# Patient Record
Sex: FEMALE | Race: WHITE | Employment: UNEMPLOYED
[De-identification: names, ages, dates, MRNs, and addresses within clinical notes are randomized per-mention and may not be internally consistent; named-entity substitution may affect disease eponyms.]

---
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
## 2023-03-13 ENCOUNTER — TELEPHONE (OUTPATIENT)
Dept: OPHTHALMOLOGY | Facility: CLINIC | Age: 23
End: 2023-03-13
Payer: MEDICAID

## 2023-03-13 NOTE — TELEPHONE ENCOUNTER
----- Message from Leisa Spann sent at 3/13/2023  4:08 PM CDT -----  Contact: Self  JONATAN pt.   ----- Message -----  From: Cindy Barrios  Sent: 3/13/2023   3:32 PM CDT  To: [facility] Clinical Support, #    Type:  Sooner Appointment Request    Caller is requesting a sooner appointment.  Caller declined first available appointment listed below.  Caller will not accept being placed on the waitlist and is requesting a message be sent to doctor.    Name of Caller:  Patient  When is the first available appointment?  N/A  Symptoms:  Pt needs eye exam & glasses  Best Call Back Number:  [phone]  Additional Information:  Please call pt back to advise. Thank You

## 2023-03-14 ENCOUNTER — TELEPHONE (OUTPATIENT)
Dept: OPHTHALMOLOGY | Facility: CLINIC | Age: 23
End: 2023-03-14
Payer: MEDICAID

## 2023-03-14 NOTE — TELEPHONE ENCOUNTER
----- Message from Leisa Solo sent at 3/14/2023  9:32 AM CDT -----  Medicaid pt. Needing an appt.     ----- Message -----  From: Ismael Segovia  Sent: 3/13/2023   4:51 PM CDT  To: [facility] Clinical Support        Name of Who is Calling:PT          What is the request in detail:PT is returning a call placed by the office to her, she believes the call is in reference to an appointment. Please be Advised!          Can the clinic reply by [facility]:no          What Number to Call Back if not in [phone]

## 2025-05-05 ENCOUNTER — OFFICE VISIT (OUTPATIENT)
Dept: URGENT CARE | Facility: CLINIC | Age: 25
End: 2025-05-05
Payer: OTHER MISCELLANEOUS

## 2025-05-05 VITALS
RESPIRATION RATE: 18 BRPM | WEIGHT: 293 LBS | SYSTOLIC BLOOD PRESSURE: 148 MMHG | OXYGEN SATURATION: 99 % | TEMPERATURE: 98 F | HEART RATE: 90 BPM | BODY MASS INDEX: 51.91 KG/M2 | HEIGHT: 63 IN | DIASTOLIC BLOOD PRESSURE: 104 MMHG

## 2025-05-05 DIAGNOSIS — S09.93XA FACIAL INJURY, INITIAL ENCOUNTER: Primary | ICD-10-CM

## 2025-05-05 DIAGNOSIS — S02.2XXA CLOSED FRACTURE OF NASAL BONE, INITIAL ENCOUNTER: ICD-10-CM

## 2025-05-05 DIAGNOSIS — S00.33XA CONTUSION OF NOSE, INITIAL ENCOUNTER: ICD-10-CM

## 2025-05-05 RX ORDER — DEXTROAMPHETAMINE SACCHARATE, AMPHETAMINE ASPARTATE, DEXTROAMPHETAMINE SULFATE AND AMPHETAMINE SULFATE 2.5; 2.5; 2.5; 2.5 MG/1; MG/1; MG/1; MG/1
1 TABLET ORAL
COMMUNITY
Start: 2025-02-09

## 2025-05-05 RX ORDER — MELOXICAM 7.5 MG/1
7.5 TABLET ORAL DAILY
Qty: 30 TABLET | Refills: 1 | Status: SHIPPED | OUTPATIENT
Start: 2025-05-05

## 2025-05-05 RX ORDER — ARIPIPRAZOLE 10 MG/1
10 TABLET ORAL
COMMUNITY
Start: 2025-03-24

## 2025-05-05 RX ORDER — TOPIRAMATE 25 MG/1
25 TABLET ORAL 2 TIMES DAILY
COMMUNITY

## 2025-05-05 RX ORDER — TOPIRAMATE 50 MG/1
50 TABLET, FILM COATED ORAL 2 TIMES DAILY
COMMUNITY

## 2025-05-05 RX ORDER — BUSPIRONE HYDROCHLORIDE 15 MG/1
15 TABLET ORAL NIGHTLY
COMMUNITY
Start: 2025-03-24

## 2025-05-05 RX ORDER — DEXTROAMPHETAMINE SULFATE, DEXTROAMPHETAMINE SACCHARATE, AMPHETAMINE SULFATE AND AMPHETAMINE ASPARTATE 7.5; 7.5; 7.5; 7.5 MG/1; MG/1; MG/1; MG/1
CAPSULE, EXTENDED RELEASE ORAL
COMMUNITY
Start: 2025-02-09

## 2025-05-05 RX ORDER — PRAZOSIN HYDROCHLORIDE 2 MG/1
2 CAPSULE ORAL NIGHTLY
COMMUNITY
Start: 2025-03-24

## 2025-05-05 RX ORDER — ONDANSETRON 4 MG/1
4 TABLET, ORALLY DISINTEGRATING ORAL EVERY 8 HOURS PRN
Qty: 15 TABLET | Refills: 0 | Status: SHIPPED | OUTPATIENT
Start: 2025-05-05

## 2025-05-05 NOTE — LETTER
[facility] [place] [address], [address]  [facility] [address]  Phone: [phone]  Fax: [phone]  [facility]: [phone]    Pt Name: Diane Campa  Injury Date: 05/05/2025   Employee ID: [id number] Date of First Treatment: 05/05/2025   Company: [employer]      Appointment Time: 07:45 AM Arrived: 800   Provider: Mario Garcia MD Time Out:845     Office Treatment:   1. Facial injury, initial encounter    2. Contusion of nose, initial encounter      Medications Ordered This Encounter   Medications    meloxicam (MOBIC) 7.5 MG tablet    ondansetron (ZOFRAN-ODT) 4 MG TbDL      Patient Instructions: Attention not to aggravate affected area, Daily home exercises/warm soaks, Apply ice 24-48 hours then apply heat/warm soaks    Restrictions: Home today, Regular Duty     Return Appointment: 5/7 at 10am     If Rx a medication that can be sedating, DO NOT TAKE DURING YOUR WORK DAY.    Some OTC measures to help in recovery(if no allergies to, renal issues or pregnant):  Tylenol 325mg 3x per day  Ibuprofen 400mg 3x per day OR Aleve regular strength one tablet 2x per day  Take Pepcid 10mg BID  If applicable or discussed: Magnesium OTC daily; Topical Voltaren Gel; Lidocaine patches, neoprene OTC compression sleeve, shoe inserts  Massage area if possible  Resting of the injured area  Ice for ankle, wrist or elbow injury  Elevation of the injured area if applicable  Heating pad for muscle injury  Stretching/ROM exercises as described in clinic.   ** BE ADVISED: You should be in regular contact with your W/C  to know the status of your claim and /or (if any)pending referrals**

## 2025-05-05 NOTE — PROGRESS NOTES
"Subjective:      Patient ID: Diane Campa is a 24 y.o. female.    Vitals:  height is 5' 3" (1.6 m) and weight is 155.6 kg (343 lb) (abnormal). Her oral temperature is 98 °F (36.7 °C). Her blood pressure is 148/104 (abnormal) and her pulse is 90. Her respiration is 18 and oxygen saturation is 99%.     Chief Complaint: Facial Injury    24/F, fell at work today . (5/5/25)   She fell on her face, injuring her nose. She is not taking anything OTC for her 7/10 pain. She is also complaining for nausea and dizziness.      Facial Injury   The incident occurred 1 to 3 hours ago. The injury mechanism was a fall. There was no loss of consciousness. The volume of blood lost was moderate. The quality of the pain is described as pulsating and aching. The pain is at a severity of 7/10. The pain is moderate. The pain has been constant since the injury. She has tried nothing for the symptoms.       HENT:  Positive for facial trauma.       Objective:     Physical Exam  Nose bleeding- hemostasis achieved in clinic  Swelling of bridge of nose.   Headache  Assessment:     1. Facial injury, initial encounter    2. Contusion of nose, initial encounter    3. Closed fracture of nasal bone, initial encounter        Plan:       Facial injury, initial encounter  -     XR NASAL BONES; Future; Expected date: 05/05/2025    Contusion of nose, initial encounter  -     XR NASAL BONES; Future; Expected date: 05/05/2025    Closed fracture of nasal bone, initial encounter    Other orders  -     ondansetron (ZOFRAN-ODT) 4 MG TbDL; Take 1 tablet (4 mg total) by mouth every 8 (eight) hours as needed.  Dispense: 15 tablet; Refill: 0  -     meloxicam (MOBIC) 7.5 MG tablet; Take 1 tablet (7.5 mg total) by mouth once daily.  Dispense: 30 tablet; Refill: 1      XR NASAL BONES  Result Date: 5/5/2025  EXAMINATION: XR NASAL BONES CLINICAL HISTORY: Unspecified injury of face, initial encounter TECHNIQUE: Three views nasal bones. COMPARISON: None FINDINGS: " There is slight cortical offset on the right lateral view with mild overlying soft tissue swelling, raising suspicion for nasal bone fracture.     As above Electronically signed by: Nhan Garrett MD Date:    05/05/2025 Time:    08:48

## 2025-05-07 ENCOUNTER — OFFICE VISIT (OUTPATIENT)
Dept: URGENT CARE | Facility: CLINIC | Age: 25
End: 2025-05-07
Payer: OTHER MISCELLANEOUS

## 2025-05-07 VITALS
OXYGEN SATURATION: 99 % | BODY MASS INDEX: 51.91 KG/M2 | WEIGHT: 293 LBS | RESPIRATION RATE: 16 BRPM | HEIGHT: 63 IN | TEMPERATURE: 98 F

## 2025-05-07 DIAGNOSIS — S02.2XXA CLOSED FRACTURE OF NASAL BONE, INITIAL ENCOUNTER: Primary | ICD-10-CM

## 2025-05-07 DIAGNOSIS — S00.33XA CONTUSION OF NOSE, INITIAL ENCOUNTER: ICD-10-CM

## 2025-05-07 DIAGNOSIS — Z02.6 ENCOUNTER RELATED TO WORKER'S COMPENSATION CLAIM: ICD-10-CM

## 2025-05-07 DIAGNOSIS — S09.93XA FACIAL INJURY, INITIAL ENCOUNTER: ICD-10-CM

## 2025-05-07 RX ORDER — HYDROCODONE BITARTRATE AND ACETAMINOPHEN 5; 325 MG/1; MG/1
1 TABLET ORAL EVERY 12 HOURS PRN
Qty: 10 TABLET | Refills: 0 | Status: SHIPPED | OUTPATIENT
Start: 2025-05-07

## 2025-05-07 NOTE — PROGRESS NOTES
Subjective:      Patient ID: Diane Campa is a 24 y.o. female.    Chief Complaint: Follow-up    Patient works at  [employer] and patient's job is   Date of initial injury: 5/5/2025   Description of injury: pt states that she fell and hit her nose on the floor.   What have you taken OTC for your symptoms: n/a  What is your current pain scale out of 10?  8  Pt states that nothing is helping the pain in her nose.  She cannot even wear her glasses.               5/5/2025-24/F, fell at work today . (5/5/25)   She fell on her face, injuring her nose. She is not taking anything OTC for her 7/10 pain. She is also complaining for nausea and dizziness.        Other  This is a new problem. The current episode started in the past 7 days. The problem occurs constantly. The problem has been unchanged. Nothing aggravates the symptoms. She has tried acetaminophen and NSAIDs for the symptoms. The treatment provided no relief.     ROS  Objective:     Physical Exam   Infraorbital bruising bilaterally.   TTP over bridge of nose.   Left frontal headache.   Assessment:      1. Closed fracture of nasal bone, initial encounter    2. Encounter related to worker's compensation claim    3. Contusion of nose, initial encounter    4. Facial injury, initial encounter      Plan:   Will keep off work til next week.     Medications Ordered This Encounter   Medications    HYDROcodone-acetaminophen (NORCO) 5-325 mg per tablet     Sig: Take 1 tablet by mouth every 12 (twelve) hours as needed for Pain.     Dispense:  10 tablet     Refill:  0     Quantity prescribed more than 7 day supply? no     I have reviewed the Prescription Drug Monitoring Program (PDMP) database for this patient prior to prescribing the above opioid medication:   Yes     Patient Instructions: Referral to specialist to be scheduled, once authorized, Attention not to aggravate affected area, Apply ice 24-48 hours then apply heat/warm soaks   Restrictions:  Disabled until next office visit  No follow-ups on file.

## 2025-05-07 NOTE — LETTER
[facility] [place] [address], [address]  [facility] [address]  Phone: [phone]  Fax: [phone]  [facility]: [phone]    Pt Name: Diane Campa  Injury Date:  5/5/25   Employee ID: [id number] Date of First Treatment: 05/07/2025   Company: [employer]      Appointment Time: 08:05 AM Arrived: 830   Provider: Mario Garcia MD Time Out:915     Office Treatment:   1. Closed fracture of nasal bone, initial encounter    2. Encounter related to worker's compensation claim    3. Contusion of nose, initial encounter    4. Facial injury, initial encounter      Medications Ordered This Encounter   Medications    HYDROcodone-acetaminophen (NORCO) 5-325 mg per tablet      Patient Instructions: Referral to specialist to be scheduled, once authorized, Attention not to aggravate affected area, Apply ice 24-48 hours then apply heat/warm soaks    Restrictions: Disabled until next office visit     Return Appointment: 5/12 at 8am or sooner if needed     If Rx a medication that can be sedating, DO NOT TAKE DURING YOUR WORK DAY.    Some OTC measures to help in recovery(if no allergies to, renal issues or pregnant):  Tylenol 325mg 3x per day  Ibuprofen 400mg 3x per day OR Aleve regular strength one tablet 2x per day  Take Pepcid 10mg BID  If applicable or discussed: Magnesium OTC daily; Topical Voltaren Gel; Lidocaine patches, neoprene OTC compression sleeve, shoe inserts  Massage area if possible  Resting of the injured area  Ice for ankle, wrist or elbow injury  Elevation of the injured area if applicable  Heating pad for muscle injury  Stretching/ROM exercises as described in clinic.   ** BE ADVISED: You should be in regular contact with your W/C  to know the status of your claim and /or (if any)pending referrals**

## 2025-05-13 ENCOUNTER — TELEPHONE (OUTPATIENT)
Dept: URGENT CARE | Facility: CLINIC | Age: 25
End: 2025-05-13
Payer: MEDICAID

## 2025-05-16 ENCOUNTER — TELEPHONE (OUTPATIENT)
Dept: URGENT CARE | Facility: CLINIC | Age: 25
End: 2025-05-16
Payer: MEDICAID

## 2025-05-16 NOTE — TELEPHONE ENCOUNTER
Called patient and left a voice message and call back number regarding the missed [facility] Appointment.   AFG

## 2025-06-02 ENCOUNTER — TELEPHONE (OUTPATIENT)
Dept: URGENT CARE | Facility: CLINIC | Age: 25
End: 2025-06-02
Payer: MEDICAID

## 2025-06-30 ENCOUNTER — OFFICE VISIT (OUTPATIENT)
Dept: URGENT CARE | Facility: CLINIC | Age: 25
End: 2025-06-30
Payer: MEDICAID

## 2025-06-30 VITALS
OXYGEN SATURATION: 99 % | TEMPERATURE: 98 F | DIASTOLIC BLOOD PRESSURE: 97 MMHG | SYSTOLIC BLOOD PRESSURE: 144 MMHG | HEIGHT: 63 IN | BODY MASS INDEX: 51.91 KG/M2 | RESPIRATION RATE: 18 BRPM | WEIGHT: 293 LBS | HEART RATE: 93 BPM

## 2025-06-30 DIAGNOSIS — H60.502 ACUTE OTITIS EXTERNA OF LEFT EAR, UNSPECIFIED TYPE: Primary | ICD-10-CM

## 2025-06-30 PROCEDURE — 99214 OFFICE O/P EST MOD 30 MIN: CPT | Mod: S$GLB,,, | Performed by: NURSE PRACTITIONER

## 2025-06-30 RX ORDER — OFLOXACIN 3 MG/ML
10 SOLUTION AURICULAR (OTIC) DAILY
Qty: 5 ML | Refills: 0 | Status: SHIPPED | OUTPATIENT
Start: 2025-06-30 | End: 2025-07-07

## 2025-06-30 NOTE — PATIENT INSTRUCTIONS

## 2025-06-30 NOTE — PROGRESS NOTES
"Subjective:      Patient ID: Diane Campa is a 24 y.o. female.    Vitals:  height is 5' 3" (1.6 m) and weight is 155.6 kg (343 lb) (abnormal). Her oral temperature is 98.2 °F (36.8 °C). Her blood pressure is 144/97 (abnormal) and her pulse is 93. Her respiration is 18 and oxygen saturation is 99%.     Chief Complaint: Otalgia    Patient c/o left ear pain onset 3 days ago.  Pain scale 8/10 pain is starting to radiate down left neck, tylenol was taken no relief.     Otalgia   There is pain in the left ear. This is a new problem. The current episode started in the past 7 days. The problem occurs constantly. The problem has been unchanged. There has been no fever. The pain is at a severity of 8/10. The pain is severe. Pertinent negatives include no abdominal pain, coughing, diarrhea, ear discharge, headaches, hearing loss, neck pain, rash, rhinorrhea, sore throat or vomiting. She has tried acetaminophen for the symptoms. The treatment provided no relief.       Constitution: Negative. Negative for chills, sweating and fatigue.   HENT:  Positive for ear pain. Negative for ear discharge, hearing loss, facial swelling, congestion and sore throat.    Neck: Negative for neck pain and painful lymph nodes.   Cardiovascular: Negative.  Negative for chest trauma, chest pain and sob on exertion.   Eyes: Negative.  Negative for eye itching and eye pain.   Respiratory: Negative.  Negative for chest tightness, cough and asthma.    Gastrointestinal: Negative.  Negative for abdominal pain, nausea, vomiting and diarrhea.   Endocrine: negative. cold intolerance and excessive thirst.   Genitourinary: Negative.  Negative for dysuria, frequency, urgency and hematuria.   Musculoskeletal:  Negative for pain, trauma and joint pain.   Skin: Negative.  Negative for rash, wound and hives.   Allergic/Immunologic: Negative.  Negative for eczema, asthma, hives and itching.   Neurological: Negative.  Negative for headaches, disorientation " and altered mental status.   Hematologic/Lymphatic: Negative.  Negative for swollen lymph nodes.   Psychiatric/Behavioral: Negative.  Negative for altered mental status, disorientation and confusion.       Objective:     Physical Exam   Constitutional: She is oriented to person, place, and time. She appears well-developed. She is cooperative.  Non-toxic appearance. She does not appear ill. No distress.   HENT:   Head: Normocephalic and atraumatic.   Ears:   Right Ear: Hearing, tympanic membrane, external ear and ear canal normal. no impacted cerumen  Left Ear: Hearing, tympanic membrane and external ear normal. There is tenderness. no impacted cerumen  Nose: Nose normal. No mucosal edema, rhinorrhea, nasal deformity or congestion. No epistaxis. Right sinus exhibits no maxillary sinus tenderness and no frontal sinus tenderness. Left sinus exhibits no maxillary sinus tenderness and no frontal sinus tenderness.   Mouth/Throat: Uvula is midline, oropharynx is clear and moist and mucous membranes are normal. No trismus in the jaw. Normal dentition. No uvula swelling. No oropharyngeal exudate, posterior oropharyngeal edema, posterior oropharyngeal erythema, tonsillar abscesses or cobblestoning. Tonsils are 0 on the right. Tonsils are 0 on the left. No tonsillar exudate.   Erythematous left ear canal 4 o'clock.. + tragus pain left side.      Comments: Erythematous left ear canal 4 o'clock.. + tragus pain left side.  Eyes: Conjunctivae and lids are normal. No scleral icterus.   Neck: Trachea normal and phonation normal. Neck supple. No edema present. No erythema present. No neck rigidity present.   Cardiovascular: Normal rate, regular rhythm, normal heart sounds and normal pulses.   Pulmonary/Chest: Effort normal and breath sounds normal. No stridor. No respiratory distress. She has no decreased breath sounds. She has no wheezes. She has no rhonchi. She has no rales. She exhibits no tenderness.   Abdominal: Normal  appearance.   Musculoskeletal: Normal range of motion.         General: No deformity. Normal range of motion.   Lymphadenopathy:     She has no cervical adenopathy.   Neurological: no focal deficit. She is alert, oriented to person, place, and time and at baseline. She exhibits normal muscle tone. Coordination normal.   Skin: Skin is warm, dry, intact, not diaphoretic and not pale.   Psychiatric: Her speech is normal and behavior is normal. Mood, judgment and thought content normal.   Nursing note and vitals reviewed.      Assessment:     1. Acute otitis externa of left ear, unspecified type        Plan:   Patient chart reviewed including pertinent history, allergies, visits and recent labs prior to prescribing antibiotics.      FOLLOWUP  Follow up if symptoms worsen or fail to improve, for PLEASE CONTACT PCP OR CONTACT THE EMERGENCY ROOM..     PATIENT INSTRUCTIONS  Patient Instructions   INSTRUCTIONS:  - Rest.  - Drink plenty of fluids.  - Take Tylenol and/or Ibuprofen as directed as needed for fever/pain.  Do not take more than the recommended dose.  - follow up with your PCP within the next 1-2 weeks as needed.  - You must understand that you have received an Urgent Care treatment only and that you may be released before all of your medical problems are known or treated.   - You, the patient, will arrange for follow up care as instructed.   - If your condition worsens or fails to improve we recommend that you receive another evaluation at the ER immediately or contact your PCP to discuss your concerns.   - You can call [phone] or [phone] to help schedule an appointment with the appropriate provider.     -If you smoke cigarettes, it would be beneficial for you to stop.         Thank you for allowing me to participate in your health care,     FNP-C     Acute otitis externa of left ear, unspecified type  -     ofloxacin (FLOXIN) 0.3 % otic solution; Place 10 drops into the left ear once daily. for 7  days  Dispense: 5 mL; Refill: 0

## 2025-07-06 ENCOUNTER — OFFICE VISIT (OUTPATIENT)
Dept: URGENT CARE | Facility: CLINIC | Age: 25
End: 2025-07-06
Payer: MEDICAID

## 2025-07-06 VITALS
SYSTOLIC BLOOD PRESSURE: 146 MMHG | HEIGHT: 63 IN | WEIGHT: 293 LBS | TEMPERATURE: 98 F | HEART RATE: 100 BPM | OXYGEN SATURATION: 98 % | DIASTOLIC BLOOD PRESSURE: 95 MMHG | BODY MASS INDEX: 51.91 KG/M2 | RESPIRATION RATE: 18 BRPM

## 2025-07-06 DIAGNOSIS — H66.92 LEFT OTITIS MEDIA, UNSPECIFIED OTITIS MEDIA TYPE: Primary | ICD-10-CM

## 2025-07-06 PROCEDURE — 99214 OFFICE O/P EST MOD 30 MIN: CPT | Mod: S$GLB,,, | Performed by: NURSE PRACTITIONER

## 2025-07-06 RX ORDER — NAPROXEN 500 MG/1
500 TABLET ORAL 2 TIMES DAILY PRN
Qty: 30 TABLET | Refills: 0 | Status: SHIPPED | OUTPATIENT
Start: 2025-07-06

## 2025-07-06 RX ORDER — AMOXICILLIN 875 MG/1
875 TABLET, COATED ORAL EVERY 12 HOURS
Qty: 14 TABLET | Refills: 0 | Status: SHIPPED | OUTPATIENT
Start: 2025-07-06 | End: 2025-07-13

## 2025-07-06 NOTE — PATIENT INSTRUCTIONS

## 2025-07-06 NOTE — PROGRESS NOTES
"Subjective:      Patient ID: Diane Campa is a 24 y.o. female.    Vitals:  height is 5' 3" (1.6 m) and weight is 155.6 kg (343 lb) (abnormal). Her oral temperature is 98.3 °F (36.8 °C). Her blood pressure is 146/95 (abnormal) and her pulse is 100. Her respiration is 18 and oxygen saturation is 98%.     Chief Complaint: Ear Problem (Nonstop unbearable pain in ear after visit last week, ear drops or ibuprofen are not helping - Entered by patient)    Pt presents with c/o dizziness, left ear pain/ discharge, left jaw pain X 1 week. Pt states was here 1 week ago was given antibiotic ear drops, no relief. Pain score 10/10    Otalgia   There is pain in the left ear. This is a new problem. The current episode started in the past 7 days. The problem occurs constantly. The problem has been unchanged. There has been no fever. The fever has been present for Less than 1 day. The pain is at a severity of 10/10. The pain is severe. Associated symptoms include ear discharge. Pertinent negatives include no abdominal pain, coughing, diarrhea, headaches, hearing loss, neck pain, rash, rhinorrhea, sore throat or vomiting. She has tried ear drops and antibiotics for the symptoms. The treatment provided no relief. There is no history of a chronic ear infection, hearing loss or a tympanostomy tube.       Constitution: Negative. Negative for chills, sweating and fatigue.   HENT:  Positive for ear pain and ear discharge. Negative for hearing loss, facial swelling, congestion and sore throat.    Neck: Negative for neck pain and painful lymph nodes.   Cardiovascular: Negative.  Negative for chest trauma, chest pain and sob on exertion.   Eyes: Negative.  Negative for eye itching and eye pain.   Respiratory: Negative.  Negative for chest tightness, cough and asthma.    Gastrointestinal: Negative.  Negative for abdominal pain, nausea, vomiting and diarrhea.   Endocrine: negative. cold intolerance and excessive thirst.   Genitourinary: " Negative.  Negative for dysuria, frequency, urgency and hematuria.   Musculoskeletal:  Negative for pain, trauma and joint pain.   Skin: Negative.  Negative for rash, wound and hives.   Allergic/Immunologic: Negative.  Negative for eczema, asthma, hives and itching.   Neurological: Negative.  Negative for headaches, disorientation and altered mental status.   Hematologic/Lymphatic: Negative.  Negative for swollen lymph nodes.   Psychiatric/Behavioral: Negative.  Negative for altered mental status, disorientation and confusion.       Objective:     Physical Exam   Constitutional: She is oriented to person, place, and time. She appears well-developed. She is cooperative.  Non-toxic appearance. She does not appear ill. No distress.   HENT:   Head: Normocephalic and atraumatic.   Ears:   Right Ear: Hearing, tympanic membrane, external ear and ear canal normal. no impacted cerumen  Left Ear: Hearing, external ear and ear canal normal. There is swelling and tenderness. Tympanic membrane is injected and erythematous. no impacted cerumen  Nose: Nose normal. No mucosal edema, rhinorrhea, nasal deformity or congestion. No epistaxis. Right sinus exhibits no maxillary sinus tenderness and no frontal sinus tenderness. Left sinus exhibits no maxillary sinus tenderness and no frontal sinus tenderness.   Mouth/Throat: Uvula is midline, oropharynx is clear and moist and mucous membranes are normal. No trismus in the jaw. Normal dentition. No uvula swelling. No oropharyngeal exudate, posterior oropharyngeal edema, posterior oropharyngeal erythema, tonsillar abscesses or cobblestoning. Tonsils are 0 on the right. Tonsils are 0 on the left. No tonsillar exudate.   Eyes: Conjunctivae and lids are normal. No scleral icterus.   Neck: Trachea normal and phonation normal. Neck supple. No edema present. No erythema present. No neck rigidity present.   Cardiovascular: Normal rate, regular rhythm, normal heart sounds and normal pulses.    Pulmonary/Chest: Effort normal and breath sounds normal. No stridor. No respiratory distress. She has no decreased breath sounds. She has no wheezes. She has no rhonchi. She has no rales. She exhibits no tenderness.   Abdominal: Normal appearance.   Musculoskeletal: Normal range of motion.         General: No deformity. Normal range of motion.   Lymphadenopathy:     She has cervical adenopathy.   Neurological: no focal deficit. She is alert, oriented to person, place, and time and at baseline. She exhibits normal muscle tone. Coordination normal.   Skin: Skin is warm, dry, intact, not diaphoretic and not pale.   Psychiatric: Her speech is normal and behavior is normal. Mood, judgment and thought content normal.   Nursing note and vitals reviewed.      Assessment:     1. Left otitis media, unspecified otitis media type        Plan:   Patient's chart reviewed in detail including all recent visits, pertinent medical history, medications, allergies and recent labs prior to prescribing medications.     FOLLOWUP  Follow up if symptoms worsen or fail to improve, for PLEASE CONTACT PCP OR CONTACT THE EMERGENCY ROOM..     PATIENT INSTRUCTIONS  Patient Instructions   INSTRUCTIONS:  - Rest.  - Drink plenty of fluids.  - Take Tylenol and/or Ibuprofen as directed as needed for fever/pain.  Do not take more than the recommended dose.  - follow up with your PCP within the next 1-2 weeks as needed.  - You must understand that you have received an Urgent Care treatment only and that you may be released before all of your medical problems are known or treated.   - You, the patient, will arrange for follow up care as instructed.   - If your condition worsens or fails to improve we recommend that you receive another evaluation at the ER immediately or contact your PCP to discuss your concerns.   - You can call [phone] or [phone] to help schedule an appointment with the appropriate provider.     -If you smoke cigarettes,  it would be beneficial for you to stop.         Thank you for allowing me to participate in your health care,     FNP-C     Left otitis media, unspecified otitis media type  -     amoxicillin (AMOXIL) 875 MG tablet; Take 1 tablet (875 mg total) by mouth every 12 (twelve) hours. for 7 days  Dispense: 14 tablet; Refill: 0  -     naproxen (NAPROSYN) 500 MG tablet; Take 1 tablet (500 mg total) by mouth 2 (two) times daily as needed (pain).  Dispense: 30 tablet; Refill: 0